# Patient Record
Sex: FEMALE
[De-identification: names, ages, dates, MRNs, and addresses within clinical notes are randomized per-mention and may not be internally consistent; named-entity substitution may affect disease eponyms.]

---

## 2018-06-28 ENCOUNTER — HOSPITAL ENCOUNTER (EMERGENCY)
Dept: HOSPITAL 14 - H.ER | Age: 38
LOS: 1 days | Discharge: HOME | End: 2018-06-29
Payer: COMMERCIAL

## 2018-06-28 VITALS — RESPIRATION RATE: 18 BRPM | OXYGEN SATURATION: 100 %

## 2018-06-28 DIAGNOSIS — R51: ICD-10-CM

## 2018-06-28 DIAGNOSIS — Z88.0: ICD-10-CM

## 2018-06-28 DIAGNOSIS — N93.8: Primary | ICD-10-CM

## 2018-06-28 LAB
ALBUMIN SERPL-MCNC: 3.8 G/DL (ref 3.5–5)
ALBUMIN/GLOB SERPL: 1.2 {RATIO} (ref 1–2.1)
ALT SERPL-CCNC: 31 U/L (ref 9–52)
AST SERPL-CCNC: 29 U/L (ref 14–36)
BASOPHILS # BLD AUTO: 0.1 K/UL (ref 0–0.2)
BASOPHILS NFR BLD: 0.5 % (ref 0–2)
BUN SERPL-MCNC: 9 MG/DL (ref 7–17)
CALCIUM SERPL-MCNC: 8.7 MG/DL (ref 8.4–10.2)
EOSINOPHIL # BLD AUTO: 0.1 K/UL (ref 0–0.7)
EOSINOPHIL NFR BLD: 1.2 % (ref 0–4)
ERYTHROCYTE [DISTWIDTH] IN BLOOD BY AUTOMATED COUNT: 13.2 % (ref 11.5–14.5)
GFR NON-AFRICAN AMERICAN: > 60
HGB BLD-MCNC: 13.3 G/DL (ref 12–16)
LYMPHOCYTES # BLD AUTO: 3.6 K/UL (ref 1–4.3)
LYMPHOCYTES NFR BLD AUTO: 35.3 % (ref 20–40)
MCH RBC QN AUTO: 29.7 PG (ref 27–31)
MCHC RBC AUTO-ENTMCNC: 33 G/DL (ref 33–37)
MCV RBC AUTO: 90 FL (ref 81–99)
MONOCYTES # BLD: 0.7 K/UL (ref 0–0.8)
MONOCYTES NFR BLD: 7.2 % (ref 0–10)
NEUTROPHILS # BLD: 5.7 K/UL (ref 1.8–7)
NEUTROPHILS NFR BLD AUTO: 55.8 % (ref 50–75)
NRBC BLD AUTO-RTO: 0.1 % (ref 0–0)
PLATELET # BLD: 219 K/UL (ref 130–400)
PMV BLD AUTO: 9.2 FL (ref 7.2–11.7)
RBC # BLD AUTO: 4.48 MIL/UL (ref 3.8–5.2)
WBC # BLD AUTO: 10.1 K/UL (ref 4.8–10.8)

## 2018-06-28 PROCEDURE — 96376 TX/PRO/DX INJ SAME DRUG ADON: CPT

## 2018-06-28 PROCEDURE — 96374 THER/PROPH/DIAG INJ IV PUSH: CPT

## 2018-06-28 PROCEDURE — 80053 COMPREHEN METABOLIC PANEL: CPT

## 2018-06-28 PROCEDURE — 81025 URINE PREGNANCY TEST: CPT

## 2018-06-28 PROCEDURE — 96375 TX/PRO/DX INJ NEW DRUG ADDON: CPT

## 2018-06-28 PROCEDURE — 96361 HYDRATE IV INFUSION ADD-ON: CPT

## 2018-06-28 PROCEDURE — 85025 COMPLETE CBC W/AUTO DIFF WBC: CPT

## 2018-06-28 PROCEDURE — 99284 EMERGENCY DEPT VISIT MOD MDM: CPT

## 2018-06-28 PROCEDURE — 70450 CT HEAD/BRAIN W/O DYE: CPT

## 2018-06-28 NOTE — ED PDOC
HPI: Female  Pain


Time Seen by Provider: 06/28/18 18:01


Chief Complaint (Nursing): Female Genitourinary


Chief Complaint (Provider): Female Genitourinary


History Per: Patient


History/Exam Limitations: no limitations


Onset/Duration Of Symptoms: Days (x3)


Current Symptoms Are (Timing): Still Present


Additional Complaint(s): 


38 year old female with no past medical history presents to the ED with 

headache and vaginal bleeding associated with lower abdominal pain, onset 3 

days ago. Patient reports of vaginal bleeding with passage of small clots and 

lower abdominal pain. Patient states she is currently on OCP. Patient reports 

her last menstrual period was about a week and a half ago and states her 

periods are a normal 28 day cycle. Patient has only had breakthrough bleeding 

once prior and has been on the same OCP for approximately two years. 

Additionally patient complains of a headache, described as a throbbing 

sensation to both temples associated with nausea, and photosensitivity for  

approximately 3 days. Patient reports of taking Advil with no relief. Otherwise

: (-) fever, (-) vomiting, (-) trauma, (-) injury, (-) URI symptoms.

















Past Medical History


Reviewed: Historical Data, Nursing Documentation, Vital Signs


Vital Signs: 





 Last Vital Signs











Temp  97.7 F   06/28/18 17:57


 


Pulse  71   06/28/18 17:57


 


Resp  18   06/28/18 17:57


 


BP  144/97 H  06/28/18 17:57


 


Pulse Ox  100   06/28/18 17:57














- Medical History


PMH: No Chronic Diseases





- Surgical History


Surgical History: Cholecystectomy





- Family History


Family History: States: Diabetes


Other Family History: Cancer





- Social History


Current smoker - smoking cessation education provided: No





- Home Medications


Home Medications: 


 Ambulatory Orders











 Medication  Instructions  Recorded


 


Acetaminophen/Butalbital/Caf 1 tab PO Q6 PRN #12 tab 06/29/18





[Fioricet]  














- Allergies


Allergies/Adverse Reactions: 


 Allergies











Allergy/AdvReac Type Severity Reaction Status Date / Time


 


Penicillins Allergy  RASH Verified 06/28/18 17:57














Review of Systems


ROS Statement: Except As Marked, All Systems Reviewed And Found Negative


Constitutional: Negative for: Fever


Gastrointestinal: Positive for: Nausea, Abdominal Pain.  Negative for: Vomiting


Genitourinary Female: Positive for: Vaginal Bleeding


Neurological: Positive for: Headache





Physical Exam





- Reviewed


Nursing Documentation Reviewed: Yes


Vital Signs Reviewed: Yes





- Physical Exam


Comments: 


GENERAL APPEARANCE: Patient is awake, alert, oriented x 3, in mild painful 

distress. 


SKIN:  Warm, dry; (-) cyanosis,  (-) rash.


HEAD:  (-) scalp swelling or tenderness, (-) temporal artery tenderness.


EYES:  (-) conjunctival pallor, (-) scleral icterus.


ENMT:  (-) sinus tenderness; mucous membranes are moist..


NECK:  (-) tenderness, (-) stiffness, (-) meningismus, (-) lymphadenopathy.


CHEST AND RESPIRATORY:  (-) rales, (-) rhonchi, (-) wheezes; breath sounds 

equal bilaterally.


HEART AND CARDIOVASCULAR:  (-) irregularity; (-) murmur, (-) gallop.


ABDOMEN AND GI:  Soft; (-) tenderness (-) distention.  Bowel sounds active; (-) 

guarding, (-) rebound, (-) palpable masses, (-) CVA tenderness.


EXTREMITIES:  (-) deformity, (-) edema, (+) distal pulses.


NEURO AND PSYCH:  Mental status as above; (-) focal findings.CNs:  Pupils 

reactive; EOMI; (-) facial asymmetry; tongue and uvula midline.  Strength  

symmetric.  








- Laboratory Results


Result Diagrams: 


 06/28/18 19:15





 06/28/18 19:15





- ECG


O2 Sat by Pulse Oximetry: 100 (RA)


Pulse Ox Interpretation: Normal (R)





Medical Decision Making


Medical Decision Making: 


Time: 1840


Plan:


-- Toradol 30 mg IVP


-- Reglan 10 mg IVP


-- Sodium Chloride IV 1000 MLS/HR 


-- Benadryl 25 mg IVP


-- IV Insertion 





Time: 1852


Plan:


-- ED Urine Dipstick 


-- ED Urine Pregnancy 





Time: 1915


Plan:


-- CBC with differentials 


-- CMP











Uhcg (-)


UA +blood, no evidence of UTI


Labs wnl. 





On re-evaluation, patient resting comfortably in no acute distress. Reports her 

headache is improving, however she does not feel comfortable going home yet. 

Repeat neuro exam shows no focal findings. 





Case endorsed to RENA Márquez at 2000 pending re-evaluation and disposition. 














___________________________________________________________________


Scribe Attestation:


Documented by Kobi Humphries, acting as a scribe for Michelle Medina PA-C.





Provider Scribe Attestation:


All medical record entries made by the Scribe were at my direction and 

personally dictated by me. I have reviewed the chart and agree that the record 

accurately reflects my personal performance of the history, physical exam, 

medical decision making, and the department course for this patient. I have 

also personally directed, reviewed, and agree with the discharge instructions 

and disposition.





Disposition





- Clinical Impression


Clinical Impression: 


 DUB (dysfunctional uterine bleeding), Headache








- Patient ED Disposition


Is Patient to be Admitted: Transfer of Care (Case endorsed to RENA Márquez at 2000 

pending re-evaluation and disposition.)


Counseled Patient/Family Regarding: Studies Performed, Diagnosis, Need For 

Followup, Rx Given





- Disposition


Referrals: 


 Service [Outside]


Disposition: Transfer of Care


Disposition Time: 20:00


Condition: STABLE


Additional Instructions: 


Thank you for letting us take care of you today. You were treated for DUB, 

migraine headache. The emergency medical care you received today was directed 

at your acute symptoms. If you were prescribed any medication, please fill it 

and take as directed. It may take several days for your symptoms to resolve. 

Return to the Emergency Department if your symptoms worsen, do not improve, or 

if you have any other problems.


   


Please contact your doctor in 2 days for re-evaluation and follow up. Bring any 

paperwork you were given at discharge with you along with any medications you 

are taking to your follow up visit. Our treatment cannot replace ongoing 

medical care by a primary care provider (PCP) outside of the emergency 

department.





Thank you for allowing the [facility] team to be part of your care today.


Prescriptions: 


Acetaminophen/Butalbital/Caf [Fioricet] 1 tab PO Q6 PRN #12 tab


 PRN Reason: Headache


Instructions:  Migraine Headache (DC), Heavy Periods (DC)


Forms:  [facility] (English)





- PA / NP / Resident Statement


MD/DO has reviewed & agrees with the documentation as recorded.

## 2018-06-28 NOTE — ED PDOC
- Laboratory Results


Result Diagrams: 


 06/28/18 19:15





 06/28/18 19:15





- ECG


O2 Sat by Pulse Oximetry: 100 (RA)





- Progress


ED Course And Treament: 





Case endorsed to writer from Adam HERRERA pending re-eval





21:15


Patient states headache only slightly improved.


States headache started yesterday am, seems to worsen with bright lights, noise

, and when vaginal bleeding becomes heavier. Located frontal scalp


Will order CT head.  Tylenol PO ordered





EXAM:


CT Head Without Intravenous Contrast


EXAM DATE/TIME:


6/28/2018 9:16 PM


CLINICAL HISTORY:


38 years old, female; Pain; Headache; Headache not specified


TECHNIQUE:


Axial computed tomography images of the head/brain without intravenous 

contrast. All CT scans at


this facility use at least one of these dose optimization techniques: automated 

exposure control; mA


and/or kV adjustment per patient size (includes targeted exams where dose is 

matched to clinical


indication); or iterative reconstruction.


Coronal and sagittal reformatted images were created and reviewed.


COMPARISON:


There are no prior studies for comparison.


FINDINGS:


Brain and ventricles: Ventricles are normal in size and configuration. There is 

no midline shift. There


are no intra-axial or extra-axial mass lesions or areas of hemorrhage. There 

are no abnormal fluid


collections. Gray-white differentiation is maintained.


Bones: Cranial vault is intact.


Soft tissues: unremarkable


Sinuses: There is no acute sinusitis. There is a retention cyst/polyp in the 

right sphenoid sinus.


Orbits: Orbital contents are unremarkable.


Ears and mastoids: Middle ears and mastoids are unremarkable


IMPRESSION: No acute intracranial





Patient notes improvement of headache after IV phenergan, IV benadryl given. 

States she would like to be discharged at this time.


Patient educated on findings, discharged with rx Fioricet


Advised follow up Gyn 2-3 days.


Follow up PMD


Return precautions given





Disposition





- Clinical Impression


Clinical Impression: 


 DUB (dysfunctional uterine bleeding), Headache








- POA


Present On Arrival: None





- Disposition


Referrals: 


 Service [Outside]


Disposition: Routine/Home


Disposition Time: 00:15


Condition: IMPROVED


Additional Instructions: 


Thank you for letting us take care of you today. You were treated for DUB, 

migraine headache. The emergency medical care you received today was directed 

at your acute symptoms. If you were prescribed any medication, please fill it 

and take as directed. It may take several days for your symptoms to resolve. 

Return to the Emergency Department if your symptoms worsen, do not improve, or 

if you have any other problems.


   


Please contact your doctor in 2 days for re-evaluation and follow up. Bring any 

paperwork you were given at discharge with you along with any medications you 

are taking to your follow up visit. Our treatment cannot replace ongoing 

medical care by a primary care provider (PCP) outside of the emergency 

department.





Thank you for allowing the [facility] team to be part of your care today.


Prescriptions: 


Acetaminophen/Butalbital/Caf [Fioricet] 1 tab PO Q6 PRN #12 tab


 PRN Reason: Headache


Instructions:  Migraine Headache (DC), Heavy Periods (DC)


Forms:  [facility] (English)

## 2018-06-28 NOTE — ED PDOC
HPI: Female  Pain


Time Seen by Provider: 06/28/18 18:01


Chief Complaint (Nursing): Female Genitourinary


Chief Complaint (Provider): Female Genitourinary


History Per: Patient


History/Exam Limitations: no limitations


Onset/Duration Of Symptoms: Days (3)


Current Symptoms Are (Timing): Still Present


Additional Complaint(s): 


38 year old female with no past medical history presents to the ED with 

headache and vaginal bleeding associated with lower abdominal pain, onset 3 

days ago. Patient reports of vaginal bleeding with passage of small clots and 

lower abdominal pain. Patient states she is currently on OCP. Patient reports 

her last menstrual period was about a week and a half ago and states her 

periods are a normal 28 day cycle. Patient has only had breakthrough bleeding 

once prior and has been on the same OCP for approximately two years. 

Additionally patient complains of a throbbing sensation to both temples 

associated with on and off headache, nausea, and photosensitivity for  

approximately 3 days. Patient reports of taking Advil with no relief. Otherwise

: (-) fever, (-) vomiting, (-) trauma, (-) injury, (-) URI symptoms.





PMD: None Provided











Past Medical History


Reviewed: Historical Data, Nursing Documentation, Vital Signs


Vital Signs: 





 Last Vital Signs











Temp  97.7 F   06/28/18 17:57


 


Pulse  71   06/28/18 17:57


 


Resp  18   06/28/18 17:57


 


BP  144/97 H  06/28/18 17:57


 


Pulse Ox  100   06/28/18 17:57














- Medical History


PMH: No Chronic Diseases





- Surgical History


Surgical History: Cholecystectomy





- Family History


Family History: States: Diabetes


Other Family History: Cancer





- Social History


Current smoker - smoking cessation education provided: No


Alcohol: None


Drugs: Denies





- Home Medications


Home Medications: 


 Ambulatory Orders











 Medication  Instructions  Recorded


 


Metoclopramide HCl [Reglan] 10 mg PO QID PRN #20 tablet 06/28/18


 


Naproxen 500 mg PO BID #30 tab 06/28/18














- Allergies


Allergies/Adverse Reactions: 


 Allergies











Allergy/AdvReac Type Severity Reaction Status Date / Time


 


Penicillins Allergy  RASH Verified 06/28/18 17:57














Review of Systems


ROS Statement: Except As Marked, All Systems Reviewed And Found Negative


Constitutional: Positive for: Other (photosensitivity )


Gastrointestinal: Positive for: Nausea, Abdominal Pain (lower ).  Negative for: 

Vomiting


Genitourinary Female: Positive for: Vaginal Bleeding (with passage of small 

clots )


Neurological: Positive for: Headache (throbbing to both temples )





Physical Exam





- Reviewed


Nursing Documentation Reviewed: Yes


Vital Signs Reviewed: Yes





- Physical Exam


Comments: 


GENERAL APPEARANCE: Patient is awake, alert, oriented x 3, in mild painful 

distress. 


SKIN:  Warm, dry; (-) cyanosis,  (-) rash.


HEAD:  (-) scalp swelling or tenderness, (-) temporal artery tenderness.


EYES:  (-) conjunctival pallor, (-) scleral icterus.


ENMT:  (-) sinus tenderness; mucous membranes are moist..


NECK:  (-) tenderness, (-) stiffness, (-) meningismus, (-) lymphadenopathy.


CHEST AND RESPIRATORY:  (-) rales, (-) rhonchi, (-) wheezes; breath sounds 

equal bilaterally.


HEART AND CARDIOVASCULAR:  (-) irregularity; (-) murmur, (-) gallop.


ABDOMEN AND GI:  Soft; (-) tenderness (-) distention.  Bowel sounds active; (-) 

guarding, (-) rebound, (-) palpable masses, (-) CVA tenderness.


EXTREMITIES:  (-) deformity, (-) edema, (+) distal pulses.


NEURO AND PSYCH:  Mental status as above; (-) focal findings.CNs:  Pupils 

reactive; EOMI; (-) facial asymmetry; tongue and uvula midline.  Strength and 

DTRs symmetric.  Babinski normal bilaterally. 




















- Laboratory Results


Result Diagrams: 


 06/28/18 19:15





 06/28/18 19:15





- ECG


O2 Sat by Pulse Oximetry: 100





Medical Decision Making


Medical Decision Making: 





Time: 1840


Plan:


-- Toradol 30 mg IVP


-- Reglan 10 mg IVP


-- Sodium Chloride IV 1000 MLS/HR 


-- Benadryl 25 mg IVP


-- IV Insertion 





Time: 1852


Plan:


-- ED Urine Dipstick 


-- ED Urine Pregnancy 





Time: 1915


Plan:


-- CBC with differentials 


-- CMP








________________________________________________________________________


Scribe Attestation:


Documented by Kobi Humphries, acting as a scribe for Michelle Medina PA-C. 





Provider Scribe Attestation:


All medical record entries made by the Scribe were at my direction and 

personally dictated by me. I have reviewed the chart and agree that the record 

accurately reflects my personal performance of the history, physical exam, 

medical decision making, and the department course for this patient. I have 

also personally directed, reviewed, and agree with the discharge instructions 

and disposition.





Disposition





- Disposition

## 2018-06-28 NOTE — CT
EXAM:

  CT Head Without Intravenous Contrast



EXAM DATE/TIME:

  6/28/2018 9:16 PM



CLINICAL HISTORY:

  38 years old, female; Pain; Headache; Headache not specified



TECHNIQUE:

  Axial computed tomography images of the head/brain without intravenous 

contrast.  All CT scans at this facility use at least one of these dose 

optimization techniques: automated exposure control; mA and/or kV adjustment 

per patient size (includes targeted exams where dose is matched to clinical 

indication); or iterative reconstruction.

  Coronal and sagittal reformatted images were created and reviewed.



COMPARISON:

  There are no prior studies for comparison.



FINDINGS:

  Brain and ventricles:  Ventricles are normal in size and configuration. There 

is no midline shift. There are no intra-axial or extra-axial mass lesions or 

areas of hemorrhage. There are no abnormal fluid collections. Gray-white 

differentiation is maintained.

  Bones: Cranial vault is intact.

  Soft tissues:  unremarkable

  Sinuses:  There is no acute sinusitis. There is a retention cyst/polyp in the 

right sphenoid sinus. 

  Orbits: Orbital contents are unremarkable.

  Ears and mastoids: Middle ears and mastoids are unremarkable



IMPRESSION:  No acute intracranial

## 2018-06-29 VITALS — HEART RATE: 82 BPM | DIASTOLIC BLOOD PRESSURE: 88 MMHG | TEMPERATURE: 98.2 F | SYSTOLIC BLOOD PRESSURE: 128 MMHG
